# Patient Record
Sex: FEMALE | Race: WHITE | NOT HISPANIC OR LATINO | Employment: FULL TIME | URBAN - METROPOLITAN AREA
[De-identification: names, ages, dates, MRNs, and addresses within clinical notes are randomized per-mention and may not be internally consistent; named-entity substitution may affect disease eponyms.]

---

## 2022-06-24 ENCOUNTER — MEDICAL CORRESPONDENCE (OUTPATIENT)
Dept: MAMMOGRAPHY | Facility: CLINIC | Age: 37
End: 2022-06-24

## 2022-11-15 ENCOUNTER — ANCILLARY PROCEDURE (OUTPATIENT)
Dept: MAMMOGRAPHY | Facility: CLINIC | Age: 37
End: 2022-11-15
Attending: OBSTETRICS & GYNECOLOGY
Payer: COMMERCIAL

## 2022-11-15 DIAGNOSIS — Z12.31 VISIT FOR SCREENING MAMMOGRAM: ICD-10-CM

## 2022-11-15 PROCEDURE — 77067 SCR MAMMO BI INCL CAD: CPT | Mod: GC

## 2023-02-05 ENCOUNTER — HEALTH MAINTENANCE LETTER (OUTPATIENT)
Age: 38
End: 2023-02-05

## 2023-11-09 ENCOUNTER — ANCILLARY PROCEDURE (OUTPATIENT)
Dept: GENERAL RADIOLOGY | Facility: CLINIC | Age: 38
End: 2023-11-09
Attending: PHYSICIAN ASSISTANT
Payer: COMMERCIAL

## 2023-11-09 ENCOUNTER — OFFICE VISIT (OUTPATIENT)
Dept: URGENT CARE | Facility: URGENT CARE | Age: 38
End: 2023-11-09
Payer: COMMERCIAL

## 2023-11-09 VITALS
OXYGEN SATURATION: 98 % | TEMPERATURE: 98.2 F | HEART RATE: 118 BPM | DIASTOLIC BLOOD PRESSURE: 72 MMHG | SYSTOLIC BLOOD PRESSURE: 129 MMHG

## 2023-11-09 DIAGNOSIS — U07.1 INFECTION DUE TO 2019 NOVEL CORONAVIRUS: Primary | ICD-10-CM

## 2023-11-09 DIAGNOSIS — U07.1 INFECTION DUE TO 2019 NOVEL CORONAVIRUS: ICD-10-CM

## 2023-11-09 DIAGNOSIS — R05.1 ACUTE COUGH: ICD-10-CM

## 2023-11-09 PROCEDURE — 99204 OFFICE O/P NEW MOD 45 MIN: CPT | Performed by: PHYSICIAN ASSISTANT

## 2023-11-09 PROCEDURE — 71046 X-RAY EXAM CHEST 2 VIEWS: CPT | Mod: TC | Performed by: RADIOLOGY

## 2023-11-09 RX ORDER — ALBUTEROL SULFATE 90 UG/1
2 AEROSOL, METERED RESPIRATORY (INHALATION) EVERY 4 HOURS PRN
Qty: 18 G | Refills: 0 | Status: SHIPPED | OUTPATIENT
Start: 2023-11-09

## 2023-11-10 NOTE — PROGRESS NOTES
Assessment & Plan     Infection due to 2019 novel coronavirus  - XR Chest 2 Views; Future  - albuterol (PROAIR HFA/PROVENTIL HFA/VENTOLIN HFA) 108 (90 Base) MCG/ACT inhaler; Inhale 2 puffs into the lungs every 4 hours as needed for shortness of breath or wheezing    Acute cough  - XR Chest 2 Views; Future  - albuterol (PROAIR HFA/PROVENTIL HFA/VENTOLIN HFA) 108 (90 Base) MCG/ACT inhaler; Inhale 2 puffs into the lungs every 4 hours as needed for shortness of breath or wheezing    Chest x-ray without infiltrate or sign of pneumonia by my read.  Awaiting read by radiology.  We discussed using an albuterol inhaler to help open up her chest.  Plenty of fluids and rest.  Follow-up to be seen if symptoms significantly worsen or last longer than 6 weeks.    Return in about 3 weeks (around 11/30/2023) for visit with primary care provider if not improving.     Haley Woo PA-C  [facility]    Subjective   Tamika De Los Santos is a 38 year old who presents for the following health issues     Patient presents with:  Urgent Care  Cough: X's 3 week no fever   Breathing Problem: When deep coughing       HPI    Tamika presents to clinic today for evaluation of a cough.  On October 24, 2-1/2 weeks ago, she developed symptoms and tested positive for COVID.  She had a body aches cough and fatigue.  She states that overall, symptoms have significantly improved but she still has a productive cough and is very tired, napping often.    Review of Systems   ROS negative except as stated above.      Objective    /72   Pulse 118   Temp 98.2  F (36.8  C) (Oral)   SpO2 98%   Physical Exam   GENERAL: healthy, alert and no distress  EYES: Eyes grossly normal to inspection, PERRL and conjunctivae and sclerae normal  HENT: ear canals and TM's normal, nose and mouth without ulcers or lesions  NECK: no adenopathy, no asymmetry, masses, or scars and thyroid normal to palpation  RESP: lungs clear to auscultation  - no rales, rhonchi or wheezes, no increased respiratory effort.  Loose sounding intermittent cough  CV: regular rate and rhythm, normal S1 S2, no S3 or S4, no murmur, click or rub, no peripheral edema and peripheral pulses strong    No results found for any visits on 11/09/23.

## 2023-12-30 ENCOUNTER — HEALTH MAINTENANCE LETTER (OUTPATIENT)
Age: 38
End: 2023-12-30

## 2024-04-18 ENCOUNTER — ANCILLARY PROCEDURE (OUTPATIENT)
Dept: MAMMOGRAPHY | Facility: CLINIC | Age: 39
End: 2024-04-18
Attending: OBSTETRICS & GYNECOLOGY
Payer: COMMERCIAL

## 2024-04-18 DIAGNOSIS — Z12.31 VISIT FOR SCREENING MAMMOGRAM: ICD-10-CM

## 2024-04-18 PROCEDURE — 77063 BREAST TOMOSYNTHESIS BI: CPT | Mod: TC | Performed by: RADIOLOGY

## 2024-04-18 PROCEDURE — 77067 SCR MAMMO BI INCL CAD: CPT | Mod: TC | Performed by: RADIOLOGY

## 2024-06-20 ASSESSMENT — ENCOUNTER SYMPTOMS
GASTROINTESTINAL NEGATIVE: 1
CONSTITUTIONAL NEGATIVE: 1
EYES NEGATIVE: 1
RESPIRATORY NEGATIVE: 1

## 2024-06-20 NOTE — PROGRESS NOTES
No chief complaint on file.     PCP: Shaheed Waggoner     Referring Provider: Referred Self    There were no vitals taken for this visit.    ENT Problem List:  There is no problem list on file for this patient.     Current Medications:  Current Outpatient Medications   Medication Sig Dispense Refill    albuterol (PROAIR HFA/PROVENTIL HFA/VENTOLIN HFA) 108 (90 Base) MCG/ACT inhaler Inhale 2 puffs into the lungs every 4 hours as needed for shortness of breath or wheezing 18 g 0    DOXYCYCLINE CALCIUM PO       ondansetron (ZOFRAN) 8 MG tablet Take 0.5-1 tablets (4-8 mg) by mouth every 8 hours as needed for nausea 9 tablet 0     No current facility-administered medications for this visit.     XR CHEST 2 VIEWS PA AND LATERAL 3/29/2024    Impression: Negative chest.    INDICATION: Chest pain  COMPARISON: None.    HPI  Pleasant 38 year old female presents today as a(n) new patient for redness and sores inside her right nostril. She states that his area was very painful and she saw her PCP. Her PCP advised her to use Vaseline in her right nostril, but she did not do this. However, she states that eventually her right nostril got better.  She states that she always feels nasal congestion and sleeps elevated because she feels as if she cannot breathe. She reports alternating nasal congestion in her nostrils.  She reports allergies to dogs and that she has a dog.  She states that she uses an inhaler and a nasal spray as needed.  She states that she had a sleep study completed that showed no sleep apnea.  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    She denies hx of diabetes, hx of long term steroid treatment, facial pressure and pain, post nasal drip, snoring, sleep apnea, grinding and clenching teeth.     Review of Systems   Constitutional: Negative.    HENT:  Positive for congestion. Negative for sinus pain.    Eyes: Negative.    Respiratory: Negative.     Gastrointestinal: Negative.    Skin: Negative.     Endo/Heme/Allergies:  Positive for environmental allergies.       Physical Exam  Vitals and nursing note reviewed.   Constitutional:       Appearance: Normal appearance.   HENT:      Head: Normocephalic and atraumatic.      Jaw: There is normal jaw occlusion.      Right Ear: Hearing, tympanic membrane and ear canal normal.      Left Ear: Hearing, tympanic membrane and ear canal normal.      Nose: Septal deviation (slight right) and congestion present. No mucosal edema or rhinorrhea.      Right Nostril: No occlusion.      Left Nostril: No occlusion.      Right Turbinates: Swollen. Not enlarged.      Left Turbinates: Enlarged and swollen.      Right Sinus: No maxillary sinus tenderness or frontal sinus tenderness.      Left Sinus: No maxillary sinus tenderness or frontal sinus tenderness.      Mouth/Throat:      Mouth: Mucous membranes are moist.      Pharynx: Oropharynx is clear. Uvula midline.   Eyes:      Extraocular Movements: Extraocular movements intact.      Pupils: Pupils are equal, round, and reactive to light.   Neurological:      Mental Status: She is alert.       A/P  This pleasant patient has bilateral turbinate hypertrophy with slight right septal deviation causing nasal congestion. There are no sinus infections present. She is recommended OTC Flonase, once daily 2 puffs in each nostril. If the problem continues, the potential of a diagnostic nasal endoscopy for further investigation and/or a septoplasty with a turbinoplasty is discussed. Questions and concerns were addressed.    Follow up in clinic in 3-4 months.    Scribe/Staff:    Scribe Disclosure:   I, Vidya Murdock, am serving as a scribe; to document services personally performed by Erick Meneses MD based on data collection and the provider's statements to me.     Provider Disclosure:  I agree with above History, Review of Systems, Physical exam and Plan.  I have reviewed the content of the documentation and have edited it as needed. I  have personally performed the services documented here and the documentation accurately represents those services and the decisions I have made.      Electronically signed by:  Erick Meneses MD

## 2024-06-25 ENCOUNTER — OFFICE VISIT (OUTPATIENT)
Dept: OTOLARYNGOLOGY | Facility: CLINIC | Age: 39
End: 2024-06-25
Payer: COMMERCIAL

## 2024-06-25 DIAGNOSIS — L73.9 NASAL FOLLICULITIS: Primary | ICD-10-CM

## 2024-06-25 PROCEDURE — 99203 OFFICE O/P NEW LOW 30 MIN: CPT | Performed by: OTOLARYNGOLOGY

## 2024-06-25 RX ORDER — SEMAGLUTIDE 2.4 MG/.75ML
2.4 INJECTION, SOLUTION SUBCUTANEOUS WEEKLY
COMMUNITY

## 2024-06-25 ASSESSMENT — ENCOUNTER SYMPTOMS: SINUS PAIN: 0

## 2024-06-25 NOTE — NURSING NOTE
Tamika De Los Santos's goals for this visit include:   Chief Complaint   Patient presents with    Consult     Sores in Rt nostril x 6 months with pain and redness. Treatments tried none.     She requests these members of her care team be copied on today's visit information: yes    PCP: Shaheed Waggoner    Referring Provider:  Referred Self, MD  No address on file    There were no vitals taken for this visit.    Do you need any medication refills at today's visit? no

## 2024-06-25 NOTE — LETTER
6/25/2024      Tamika De Los Santos  [address] [address] [facility] [address]      Dear Colleague,    Thank you for referring your patient, Tamika De Los Santos, to the [facility]. Please see a copy of my visit note below.    No chief complaint on file.     PCP: Shaheed Waggoner     Referring Provider: Referred Self    There were no vitals taken for this visit.    ENT Problem List:  There is no problem list on file for this patient.     Current Medications:  Current Outpatient Medications   Medication Sig Dispense Refill     albuterol (PROAIR HFA/PROVENTIL HFA/VENTOLIN HFA) 108 (90 Base) MCG/ACT inhaler Inhale 2 puffs into the lungs every 4 hours as needed for shortness of breath or wheezing 18 g 0     DOXYCYCLINE CALCIUM PO        ondansetron (ZOFRAN) 8 MG tablet Take 0.5-1 tablets (4-8 mg) by mouth every 8 hours as needed for nausea 9 tablet 0     No current facility-administered medications for this visit.     XR CHEST 2 VIEWS PA AND LATERAL 3/29/2024    Impression: Negative chest.    INDICATION: Chest pain  COMPARISON: None.    HPI  Pleasant 38 year old female presents today as a(n) new patient for redness and sores inside her right nostril. She states that his area was very painful and she saw her PCP. Her PCP advised her to use Vaseline in her right nostril, but she did not do this. However, she states that eventually her right nostril got better.  She states that she always feels nasal congestion and sleeps elevated because she feels as if she cannot breathe. She reports alternating nasal congestion in her nostrils.  She reports allergies to dogs and that she has a dog.  She states that she uses an inhaler and a nasal spray as needed.  She states that she had a sleep study completed that showed no sleep apnea.  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    She denies hx of diabetes, hx of long term steroid treatment, facial pressure and pain, post nasal drip, snoring, sleep  apnea, grinding and clenching teeth.     Review of Systems   Constitutional: Negative.    HENT:  Positive for congestion. Negative for sinus pain.    Eyes: Negative.    Respiratory: Negative.     Gastrointestinal: Negative.    Skin: Negative.    Endo/Heme/Allergies:  Positive for environmental allergies.       Physical Exam  Vitals and nursing note reviewed.   Constitutional:       Appearance: Normal appearance.   HENT:      Head: Normocephalic and atraumatic.      Jaw: There is normal jaw occlusion.      Right Ear: Hearing, tympanic membrane and ear canal normal.      Left Ear: Hearing, tympanic membrane and ear canal normal.      Nose: Septal deviation (slight right) and congestion present. No mucosal edema or rhinorrhea.      Right Nostril: No occlusion.      Left Nostril: No occlusion.      Right Turbinates: Swollen. Not enlarged.      Left Turbinates: Enlarged and swollen.      Right Sinus: No maxillary sinus tenderness or frontal sinus tenderness.      Left Sinus: No maxillary sinus tenderness or frontal sinus tenderness.      Mouth/Throat:      Mouth: Mucous membranes are moist.      Pharynx: Oropharynx is clear. Uvula midline.   Eyes:      Extraocular Movements: Extraocular movements intact.      Pupils: Pupils are equal, round, and reactive to light.   Neurological:      Mental Status: She is alert.       A/P  This pleasant patient has bilateral turbinate hypertrophy with slight right septal deviation causing nasal congestion. There are no sinus infections present. She is recommended OTC Flonase, once daily 2 puffs in each nostril. If the problem continues, the potential of a diagnostic nasal endoscopy for further investigation and/or a septoplasty with a turbinoplasty is discussed. Questions and concerns were addressed.    Follow up in clinic in 3-4 months.    Scribe/Staff:    Scribe Disclosure:   Vidya CARRION, am serving as a scribe; to document services personally performed by Erick Meneses MD  based on data collection and the provider's statements to me.     Provider Disclosure:  I agree with above History, Review of Systems, Physical exam and Plan.  I have reviewed the content of the documentation and have edited it as needed. I have personally performed the services documented here and the documentation accurately represents those services and the decisions I have made.      Electronically signed by:  Erick Meneses MD         Again, thank you for allowing me to participate in the care of your patient.        Sincerely,        Erick Meneses MD

## 2024-08-30 ENCOUNTER — HOSPITAL ENCOUNTER (OUTPATIENT)
Facility: CLINIC | Age: 39
End: 2024-08-30
Attending: OBSTETRICS & GYNECOLOGY | Admitting: OBSTETRICS & GYNECOLOGY
Payer: COMMERCIAL

## 2024-10-07 RX ORDER — AZELASTINE HYDROCHLORIDE, FLUTICASONE PROPIONATE 137; 50 UG/1; UG/1
SPRAY, METERED NASAL 2 TIMES DAILY
COMMUNITY
End: 2024-10-10 | Stop reason: HOSPADM

## 2024-10-07 RX ORDER — CALCIUM CITRATE/VITAMIN D3 315MG-6.25
TABLET ORAL 2 TIMES DAILY
COMMUNITY
End: 2024-10-10 | Stop reason: HOSPADM

## 2024-10-07 RX ORDER — FLUTICASONE PROPIONATE 50 MCG
1 SPRAY, SUSPENSION (ML) NASAL DAILY
COMMUNITY
End: 2024-10-10 | Stop reason: HOSPADM

## 2024-10-08 RX ORDER — MULTIVITAMIN
1 TABLET ORAL DAILY
COMMUNITY
End: 2024-10-10 | Stop reason: HOSPADM

## 2024-10-08 RX ORDER — ERGOCALCIFEROL (VITAMIN D2) 50 MCG
CAPSULE ORAL
COMMUNITY
End: 2024-10-10 | Stop reason: HOSPADM

## 2024-10-10 RX ORDER — METRONIDAZOLE 500 MG/100ML
500 INJECTION, SOLUTION INTRAVENOUS
Status: CANCELLED | OUTPATIENT
Start: 2024-10-10

## 2024-10-10 RX ORDER — ACETAMINOPHEN 325 MG/1
975 TABLET ORAL ONCE
Status: CANCELLED | OUTPATIENT
Start: 2024-10-10 | End: 2024-10-10

## 2024-10-10 RX ORDER — PHENAZOPYRIDINE HYDROCHLORIDE 200 MG/1
200 TABLET, FILM COATED ORAL ONCE
Status: CANCELLED | OUTPATIENT
Start: 2024-10-10 | End: 2024-10-10

## 2024-10-10 RX ORDER — CEFAZOLIN SODIUM/WATER 2 G/20 ML
2 SYRINGE (ML) INTRAVENOUS
Status: CANCELLED | OUTPATIENT
Start: 2024-10-10

## 2024-10-10 RX ORDER — CEFAZOLIN SODIUM/WATER 2 G/20 ML
2 SYRINGE (ML) INTRAVENOUS SEE ADMIN INSTRUCTIONS
Status: CANCELLED | OUTPATIENT
Start: 2024-10-10

## 2024-10-11 PROCEDURE — 88307 TISSUE EXAM BY PATHOLOGIST: CPT | Mod: 26 | Performed by: PATHOLOGY

## 2024-10-11 PROCEDURE — 88307 TISSUE EXAM BY PATHOLOGIST: CPT | Mod: TC,ORL | Performed by: OBSTETRICS & GYNECOLOGY

## 2024-10-14 ENCOUNTER — LAB REQUISITION (OUTPATIENT)
Dept: LAB | Facility: CLINIC | Age: 39
End: 2024-10-14
Payer: COMMERCIAL

## 2024-10-16 LAB
PATH REPORT.COMMENTS IMP SPEC: NORMAL
PATH REPORT.COMMENTS IMP SPEC: NORMAL
PATH REPORT.FINAL DX SPEC: NORMAL
PATH REPORT.GROSS SPEC: NORMAL
PATH REPORT.MICROSCOPIC SPEC OTHER STN: NORMAL
PATH REPORT.RELEVANT HX SPEC: NORMAL
PHOTO IMAGE: NORMAL

## 2025-04-06 ENCOUNTER — HEALTH MAINTENANCE LETTER (OUTPATIENT)
Age: 40
End: 2025-04-06